# Patient Record
(demographics unavailable — no encounter records)

---

## 2024-11-12 NOTE — DISCUSSION/SUMMARY
[FreeTextEntry1] : 47-year-old woman history of attention deficit disorder, doing well with combination of Vyvanse 30 mg in the morning and Adderall, immediate release 7.5 mg in the afternoon. Patient will call for refills as needed Reviewed and discussed treatment, no change this time. Return to office, 6 months.

## 2025-04-02 NOTE — HEALTH RISK ASSESSMENT
[Yes] : Yes [2 - 4 times a month (2 pts)] : 2-4 times a month (2 points) [1 or 2 (0 pts)] : 1 or 2 (0 points) [Never (0 pts)] : Never (0 points) [No] : In the past 12 months have you used drugs other than those required for medical reasons? No [No falls in past year] : Patient reported no falls in the past year [0] : 2) Feeling down, depressed, or hopeless: Not at all (0) [PHQ-2 Negative - No further assessment needed] : PHQ-2 Negative - No further assessment needed [Never] : Never [NO] : No [HIV test declined] : HIV test declined [Hepatitis C test declined] : Hepatitis C test declined [With Family] : lives with family [# of Members in Household ___] :  household currently consist of [unfilled] member(s) [Employed] : employed [College] : College [] :  [# Of Children ___] : has [unfilled] children [Sexually Active] : sexually active [Fully functional (bathing, dressing, toileting, transferring, walking, feeding)] : Fully functional (bathing, dressing, toileting, transferring, walking, feeding) [Fully functional (using the telephone, shopping, preparing meals, housekeeping, doing laundry, using] : Fully functional and needs no help or supervision to perform IADLs (using the telephone, shopping, preparing meals, housekeeping, doing laundry, using transportation, managing medications and managing finances) [Reports normal functional visual acuity (ie: able to read med bottle)] : Reports normal functional visual acuity [Smoke Detector] : smoke detector [Carbon Monoxide Detector] : carbon monoxide detector [Seat Belt] :  uses seat belt [Sunscreen] : uses sunscreen [Travel to Developing Areas] : travel to developing areas [Very Good] : ~his/her~  mood as very good [Audit-CScore] : 2 [EOV8Evuel] : 0 [Patient reported mammogram was normal] : Patient reported mammogram was normal [Patient reported PAP Smear was normal] : Patient reported PAP Smear was normal [Patient reported colonoscopy was abnormal] : Patient reported colonoscopy was abnormal [Change in mental status noted] : No change in mental status noted [Language] : denies difficulty with language [Behavior] : denies difficulty with behavior [Learning/Retaining New Information] : denies difficulty learning/retaining new information [Handling Complex Tasks] : denies difficulty handling complex tasks [Reasoning] : denies difficulty with reasoning [Spatial Ability and Orientation] : denies difficulty with spatial ability and orientation [Reports changes in hearing] : Reports no changes in hearing [Reports changes in vision] : Reports no changes in vision [Reports changes in dental health] : Reports no changes in dental health [TB Exposure] : is not being exposed to tuberculosis [MammogramDate] : 02/24 [PapSmearDate] : 02/24 [ColonoscopyDate] : 12/24 [ColonoscopyComments] : 4 precancerous polyps

## 2025-04-02 NOTE — PHYSICAL EXAM

## 2025-04-02 NOTE — COUNSELING
[Fall prevention counseling provided] : Fall prevention counseling provided [Behavioral health counseling provided] : Behavioral health counseling provided [Potential consequences of obesity discussed] : Potential consequences of obesity discussed [Benefits of weight loss discussed] : Benefits of weight loss discussed [Structured Weight Management Program suggested:] : Structured weight management program suggested [Good understanding] : Patient has a good understanding of disease, goals and obesity follow-up plan

## 2025-05-19 NOTE — ASSESSMENT
[FreeTextEntry1] : 47-year-old woman with a history of attention deficit disorder, doing well, on Vyvanse 30 mg daily, uses Adderall, immediate release 7.5 mg if needed. Discussed options, no change at this time.  Patient will call for refills as needed. New onset postural vertigo, likely benign positional vertigo, possibly in the right ear. Reviewed and discussed the Richard-Kim vestibular exercises. I will send a prescription for meclizine 12.5 mg p.o. 3 times daily, 4 times daily as needed. Advised the patient he may call back within 10 to 14 days if no further improvement Place referral for vestibular therapy if symptoms persist. Return to office, 6 months.

## 2025-05-19 NOTE — PHYSICAL EXAM
[General Appearance - Alert] : alert [General Appearance - In No Acute Distress] : in no acute distress [General Appearance - Well Nourished] : well nourished [General Appearance - Well Developed] : well developed [General Appearance - Well-Appearing] : healthy appearing [] : normal voice and communication [Oriented To Time, Place, And Person] : oriented to person, place, and time [Impaired Insight] : insight and judgment were intact [Affect] : the affect was normal [Mood] : the mood was normal [Memory Recent] : recent memory was not impaired [Memory Remote] : remote memory was not impaired [Person] : oriented to person [Place] : oriented to place [Time] : oriented to time [Cranial Nerves Optic (II)] : visual acuity intact bilaterally,  visual fields full to confrontation, pupils equal round and reactive to light [Cranial Nerves Oculomotor (III)] : extraocular motion intact [Cranial Nerves Trigeminal (V)] : facial sensation intact symmetrically [Cranial Nerves Facial (VII)] : face symmetrical [Cranial Nerves Glossopharyngeal (IX)] : tongue and palate midline [Cranial Nerves Accessory (XI - Cranial And Spinal)] : head turning and shoulder shrug symmetric [Cranial Nerves Hypoglossal (XII)] : there was no tongue deviation with protrusion [No Hearing Loss] : no hearing loss [Present] : nystagmus seen [Nystagmus] : [place] rotary nystagmus with right ear down [Motor Tone] : muscle tone was normal in all four extremities [Motor Strength] : muscle strength was normal in all four extremities [No Muscle Atrophy] : normal bulk in all four extremities [Motor Handedness Right-Handed] : the patient is right hand dominant [Sensation Tactile Decrease] : light touch was intact [Abnormal Walk] : normal gait [Balance] : balance was intact [Past-pointing] : there was no past-pointing [Tremor] : no tremor present [Dysdiadochokinesia Bilaterally] : not present [Coordination - Dysmetria Impaired Finger-to-Nose Bilateral] : not present [2+] : Ankle jerk left 2+

## 2025-05-19 NOTE — HISTORY OF PRESENT ILLNESS
[FreeTextEntry1] : 47-year-old woman with a history of migraines, attention deficit disorder, here for follow-up visit, last time seen in the office November 2024.  Noticed for the last week or so, new onset vertigo, feeling of the head spinning when she gets up specially from bed, also if she turns quickly, bends forward, looks up.  Usually last a few seconds and eventually goes away although feels somewhat unsteady in between episodes.  Has nausea to some extent. No recent injuries, no head trauma, no fever or chills, no sinus infections, occasionally feels a sensation of the left ear water in the ear, but no pain, no sudden sensation of pressure in the ear.  No change in hearing or tinnitus. No chest pain, palpitations. No persistent headache, denies any transient unilateral weakness, numbness of the face or extremities.  No change in vision.

## 2025-06-24 NOTE — HISTORY OF PRESENT ILLNESS
[FreeTextEntry1] : 48yo  female with an LMP of 25 presents today for annual gyn visit Complains of vaginal dryness with sexual relations Complains of occ loss of urine with cough/sneeze, wears liners  Menstrual triad: 14 x 28 x 4-5d  Gyn: Last pap - NL No hx of abnormal pap  OB 2005 M 6lb6oz  2009 F 7lb8oz   intermittently x 6months   PMHx HTN ADHD HLD  FHx:  Father-  cardiac arrest ()  SH: ,  valarie @ [facility].  Works in [employer]  Former social smoking in HS, weekend alcohol [Mammogramdate] : 2024 [TextBox_19] : BIRADS 1- fibroglandular density  [BreastSonogramDate] : 2024 [PapSmeardate] : 2024 [TextBox_31] : NL [TextBox_43] : polypectomy [ColonoscopyDate] : 12/2024

## 2025-06-24 NOTE — HISTORY OF PRESENT ILLNESS
[FreeTextEntry1] : 48yo  female with an LMP of 25 presents today for annual gyn visit Complains of vaginal dryness with sexual relations Complains of occ loss of urine with cough/sneeze, wears liners  Menstrual triad: 14 x 28 x 4-5d  Gyn: Last pap - NL No hx of abnormal pap  OB 2005 M 6lb6oz  2009 F 7lb8oz   intermittently x 6months   PMHx HTN ADHD HLD  FHx:  Father-  cardiac arrest ()  SH: ,  valarie @ [facility].  Works in [employer]  Former social smoking in HS, weekend alcohol [Mammogramdate] : 2024 [TextBox_19] : BIRADS 1- fibroglandular density  [BreastSonogramDate] : 2024 [PapSmeardate] : 2024 [TextBox_31] : NL [ColonoscopyDate] : 12/2024 [TextBox_43] : polypectomy

## 2025-06-24 NOTE — PHYSICAL EXAM
[Chaperoned Physical Exam] : A chaperone was present in the examining room during all aspects of the physical examination. [PA] : PA [Appropriately responsive] : appropriately responsive [Alert] : alert [No Acute Distress] : no acute distress [No Lymphadenopathy] : no lymphadenopathy [Soft] : soft [Non-tender] : non-tender [Oriented x3] : oriented x3 [Examination Of The Breasts] : a normal appearance [No Masses] : no breast masses were palpable [Labia Majora] : normal [Labia Minora] : normal [Normal] : normal [Uterine Adnexae] : normal [Declined] : Patient declined rectal exam [FreeTextEntry2] : Sowmya Mantilla

## 2025-06-24 NOTE — DISCUSSION/SUMMARY
[FreeTextEntry1] : Health Maintenance:   -Pap with HPV (2024) NL -Mammo Rx -TBSE -colonoscopy guidelines reviewed with patient -Achieve Vit D levels of 30-40, intake of 1100 mg daily calcium mostly thru dark green leafy greens and milk products, exercise 30 minutes TIW  Vaginal dryness - Discussed use of coconut oil or water based lubricant  MIGDALIA -Discussed consult with Urogyn - Briefly discussed mesh -Contact information given for Urogyn